# Patient Record
Sex: MALE | Race: WHITE | NOT HISPANIC OR LATINO | URBAN - METROPOLITAN AREA
[De-identification: names, ages, dates, MRNs, and addresses within clinical notes are randomized per-mention and may not be internally consistent; named-entity substitution may affect disease eponyms.]

---

## 2024-10-04 ENCOUNTER — APPOINTMENT (OUTPATIENT)
Dept: BEHAVIORAL HEALTH | Facility: CLINIC | Age: 24
End: 2024-10-04
Payer: COMMERCIAL

## 2024-10-04 VITALS
SYSTOLIC BLOOD PRESSURE: 124 MMHG | HEIGHT: 72 IN | BODY MASS INDEX: 29.19 KG/M2 | HEART RATE: 74 BPM | TEMPERATURE: 97.5 F | DIASTOLIC BLOOD PRESSURE: 78 MMHG | WEIGHT: 215.5 LBS

## 2024-10-04 DIAGNOSIS — F32.89 OTHER SPECIFIED DEPRESSIVE EPISODES: ICD-10-CM

## 2024-10-04 DIAGNOSIS — F90.0 ADHD (ATTENTION DEFICIT HYPERACTIVITY DISORDER), INATTENTIVE TYPE: Primary | ICD-10-CM

## 2024-10-04 PROCEDURE — 99214 OFFICE O/P EST MOD 30 MIN: CPT | Performed by: PSYCHIATRY & NEUROLOGY

## 2024-10-04 PROCEDURE — 3008F BODY MASS INDEX DOCD: CPT | Performed by: PSYCHIATRY & NEUROLOGY

## 2024-10-04 RX ORDER — SERTRALINE HYDROCHLORIDE 100 MG/1
TABLET, FILM COATED ORAL
Qty: 135 TABLET | Refills: 1 | Status: SHIPPED | OUTPATIENT
Start: 2024-10-04

## 2024-10-04 RX ORDER — DEXTROAMPHETAMINE SACCHARATE, AMPHETAMINE ASPARTATE MONOHYDRATE, DEXTROAMPHETAMINE SULFATE AND AMPHETAMINE SULFATE 5; 5; 5; 5 MG/1; MG/1; MG/1; MG/1
20 CAPSULE, EXTENDED RELEASE ORAL EVERY MORNING
Qty: 30 CAPSULE | Refills: 0 | Status: SHIPPED | OUTPATIENT
Start: 2024-10-04 | End: 2024-11-03

## 2024-10-05 NOTE — PROGRESS NOTES
In-person appointment with patient and mother, seen together and patient seen individually.     Last seen 1/2024.      Sertraline 150 mg continues to help with mood.  Denies adverse effects.      Patient reports, and mother agrees, that ADHD symptoms are impairing functioning.    Having difficulty with sustained attention with his [employer] classes.    He has been more forgetful and prone to losing things such as phone, wallet, keys.    Because of above symptoms he has become more frustrated and anxious    Sleep and appetite normal.      Maintaining good grades but with considerable effort.      Good relationship with girlfriend    He denies suicidal or homicidal ideation.     Uses medical cannabis for anxiety    Vitals (reviewed during the appointment):  /78, HR 74, temp 97.5, height 6ft, weight 215 lbs    MSE:  Normal dress and grooming.  Speech normal tone, rate, quality.  Thought process goal-directed.  Euthymic mood, normal range of affect.  Denies suicidal or homicidal ideation.  Alert and oriented X 4.  Judgment and insight fair/good    Dx:  ADHD;  Other Specified Depressive Episodes    Plan:    Begin Adderall XR 20 mg, one every morning.  Consent obtained after review of risks and benefits.   Rx for 20 mg #30 sent to [facility]  Controlled Substance Agreement Form completed  OAR[place] reviewed    Continue Sertraline 150 mg every morning. Ongoing consent obtained.    Rx for 100 mg #135 with one refill sent to [facility]    Follow-up 2-3 months, update 2 weeks

## 2024-10-22 ENCOUNTER — DOCUMENTATION (OUTPATIENT)
Dept: BEHAVIORAL HEALTH | Facility: HOSPITAL | Age: 24
End: 2024-10-22
Payer: COMMERCIAL

## 2024-10-22 DIAGNOSIS — F90.0 ADHD (ATTENTION DEFICIT HYPERACTIVITY DISORDER), INATTENTIVE TYPE: ICD-10-CM

## 2024-10-22 RX ORDER — DEXTROAMPHETAMINE SACCHARATE, AMPHETAMINE ASPARTATE MONOHYDRATE, DEXTROAMPHETAMINE SULFATE AND AMPHETAMINE SULFATE 5; 5; 5; 5 MG/1; MG/1; MG/1; MG/1
20 CAPSULE, EXTENDED RELEASE ORAL EVERY MORNING
Qty: 30 CAPSULE | Refills: 0 | Status: SHIPPED | OUTPATIENT
Start: 2024-10-22 | End: 2024-11-21

## 2024-10-22 NOTE — PROGRESS NOTES
Message from patient below 10/21.  Rx for next Adderall XR 20 mg #30 sent to [facility] 10/22.     Hello, I am emailing to let you know that my meds have been working very well.    Thank you,    García Cha

## 2025-01-10 ENCOUNTER — APPOINTMENT (OUTPATIENT)
Dept: BEHAVIORAL HEALTH | Facility: CLINIC | Age: 25
End: 2025-01-10
Payer: COMMERCIAL

## 2025-01-10 VITALS
TEMPERATURE: 97.6 F | DIASTOLIC BLOOD PRESSURE: 77 MMHG | BODY MASS INDEX: 25.86 KG/M2 | HEIGHT: 72 IN | SYSTOLIC BLOOD PRESSURE: 143 MMHG | WEIGHT: 190.9 LBS

## 2025-01-10 DIAGNOSIS — F32.89 OTHER SPECIFIED DEPRESSIVE EPISODES: ICD-10-CM

## 2025-01-10 DIAGNOSIS — F90.0 ADHD (ATTENTION DEFICIT HYPERACTIVITY DISORDER), INATTENTIVE TYPE: Primary | ICD-10-CM

## 2025-01-10 PROCEDURE — 3008F BODY MASS INDEX DOCD: CPT | Performed by: PSYCHIATRY & NEUROLOGY

## 2025-01-10 PROCEDURE — 99213 OFFICE O/P EST LOW 20 MIN: CPT | Performed by: PSYCHIATRY & NEUROLOGY

## 2025-01-10 RX ORDER — DEXTROAMPHETAMINE SACCHARATE, AMPHETAMINE ASPARTATE, DEXTROAMPHETAMINE SULFATE AND AMPHETAMINE SULFATE 2.5; 2.5; 2.5; 2.5 MG/1; MG/1; MG/1; MG/1
10 TABLET ORAL DAILY
Qty: 30 TABLET | Refills: 0 | Status: SHIPPED | OUTPATIENT
Start: 2025-01-10 | End: 2025-02-09

## 2025-01-12 NOTE — PROGRESS NOTES
In-person appointment with patient     He has been adherent to medication regimen of Adderall XR 20 mg in the morning and Sertraline 150 mg in the morning.      He states Adderall XR 20 mg works well but for too long with his current academic schedule.  He would prefer stimulant to work ~6 hours as opposed to 8 hours or longer.  Preferred hours are 10:00 am-4:00 pm.     He has not required stimulant on non-academic days and has not taken for the last 2 weeks.      He states mood has been good.    He denies any sustained sadness or heightened anxiety.    No doyle or hallucinations.   Denies substance use    Sleep and appetite good    Denies physical complaints    Vitals (reviewed with patient):  /77, temp 97.6, height 6ft, weight 190 lbs    MSE:  Normal dress and grooming.  Thought process goal-directed.  Speech normal tone, rate, quality.  Euthymic mood, full range of affect.  Denies suicidal or homicidal ideation.   Alert and oriented X 4.  No agitation.  Judgment and insight good    Dx:  ADHD;  Other Specified Depressive Episodes    Plan:    Discontinue Adderall XR 20 mg    Begin Adderall 10 mg on academic days.  Consent obtained after review of risks and benefits.  Rx for 10 mg #30 sent to [facility]    Monitor blood pressure    Continue Sertraline 150 mg every morning.  Ongoing consent obtained.  Has sufficient supply.      Follow-up in 3 months, update 2 weeks

## 2025-03-18 DIAGNOSIS — F90.0 ADHD (ATTENTION DEFICIT HYPERACTIVITY DISORDER), INATTENTIVE TYPE: ICD-10-CM

## 2025-03-18 RX ORDER — DEXTROAMPHETAMINE SACCHARATE, AMPHETAMINE ASPARTATE, DEXTROAMPHETAMINE SULFATE AND AMPHETAMINE SULFATE 2.5; 2.5; 2.5; 2.5 MG/1; MG/1; MG/1; MG/1
10 TABLET ORAL DAILY
Qty: 30 TABLET | Refills: 0 | Status: SHIPPED | OUTPATIENT
Start: 2025-03-18 | End: 2025-04-17

## 2025-05-01 DIAGNOSIS — F90.0 ADHD (ATTENTION DEFICIT HYPERACTIVITY DISORDER), INATTENTIVE TYPE: ICD-10-CM

## 2025-05-01 RX ORDER — DEXTROAMPHETAMINE SACCHARATE, AMPHETAMINE ASPARTATE, DEXTROAMPHETAMINE SULFATE AND AMPHETAMINE SULFATE 2.5; 2.5; 2.5; 2.5 MG/1; MG/1; MG/1; MG/1
10 TABLET ORAL DAILY
Qty: 30 TABLET | Refills: 0 | Status: SHIPPED | OUTPATIENT
Start: 2025-05-01 | End: 2025-05-31

## 2025-07-11 DIAGNOSIS — F32.89 OTHER SPECIFIED DEPRESSIVE EPISODES: ICD-10-CM

## 2025-07-11 RX ORDER — SERTRALINE HYDROCHLORIDE 100 MG/1
TABLET, FILM COATED ORAL
Qty: 45 TABLET | Refills: 0 | Status: SHIPPED | OUTPATIENT
Start: 2025-07-11 | End: 2025-07-17 | Stop reason: SDUPTHER

## 2025-07-14 ENCOUNTER — DOCUMENTATION (OUTPATIENT)
Dept: BEHAVIORAL HEALTH | Facility: HOSPITAL | Age: 25
End: 2025-07-14
Payer: COMMERCIAL

## 2025-07-14 NOTE — PROGRESS NOTES
7/10-7/11/25 communication with patient below.     Yes, call next week to set up an appointment, [phone].  I sent a one month Rx to your [facility]    From: García Cha <[email or web address]>   Sent: Friday, July 11, 2025 6:01 PM  To: Keith Wells [email or web address]>  Subject: Re: prescription refill    I only have a few days worth. Could I make an appointment for some point in august. Thank you, García Cha On Jul 10, 2025, at 1:?33 PM, Keith Wells [email or web address] wrote: ? How much of a supply do you have remaining  ZjQcmQRYFpfptBannerStart  Notice - This message is from a new sender    You have not previously corresponded with this sender. If the sender appears to be someone you know, verify they sent this message via phone, text, or in person communication.        Report Suspicious             ZjQcmQRYFpfptBannerEnd  I only have a few days worth. Could I make an appointment for some point in august. Thank you,  García Cha      On Jul 10, 2025, at 1:33?PM, Keith Wells [email or web address]> wrote:  ?   How much of a supply do you have remaining García?  With last appointment in January we need to schedule a follow-up appointment     From: García LIZARRAGA <[email or web address]>   Sent: Thursday, July 10, 2025 12:59 PM  To: Keith Wells [email or web address]>  Subject: prescription refill     hello,  I would like to get a refill on my antidepressant medication. Thank you, García alexander,      I would like to get a refill on my antidepressant medication.     Thank you,     García Cha

## 2025-07-17 ENCOUNTER — OFFICE VISIT (OUTPATIENT)
Dept: BEHAVIORAL HEALTH | Facility: CLINIC | Age: 25
End: 2025-07-17
Payer: COMMERCIAL

## 2025-07-17 VITALS
BODY MASS INDEX: 22.54 KG/M2 | DIASTOLIC BLOOD PRESSURE: 74 MMHG | HEART RATE: 69 BPM | TEMPERATURE: 98.6 F | SYSTOLIC BLOOD PRESSURE: 117 MMHG | HEIGHT: 72 IN | WEIGHT: 166.38 LBS

## 2025-07-17 DIAGNOSIS — F90.0 ADHD (ATTENTION DEFICIT HYPERACTIVITY DISORDER), INATTENTIVE TYPE: Primary | ICD-10-CM

## 2025-07-17 DIAGNOSIS — F32.89 OTHER SPECIFIED DEPRESSIVE EPISODES: ICD-10-CM

## 2025-07-17 PROCEDURE — 3008F BODY MASS INDEX DOCD: CPT | Performed by: PSYCHIATRY & NEUROLOGY

## 2025-07-17 PROCEDURE — 99214 OFFICE O/P EST MOD 30 MIN: CPT | Performed by: PSYCHIATRY & NEUROLOGY

## 2025-07-17 RX ORDER — DEXTROAMPHETAMINE SACCHARATE, AMPHETAMINE ASPARTATE, DEXTROAMPHETAMINE SULFATE AND AMPHETAMINE SULFATE 2.5; 2.5; 2.5; 2.5 MG/1; MG/1; MG/1; MG/1
10 TABLET ORAL DAILY
Qty: 30 TABLET | Refills: 0 | Status: SHIPPED | OUTPATIENT
Start: 2025-09-15 | End: 2025-10-15

## 2025-07-17 RX ORDER — SERTRALINE HYDROCHLORIDE 100 MG/1
TABLET, FILM COATED ORAL
Qty: 135 TABLET | Refills: 1 | Status: SHIPPED | OUTPATIENT
Start: 2025-07-17

## 2025-07-17 RX ORDER — DEXTROAMPHETAMINE SACCHARATE, AMPHETAMINE ASPARTATE, DEXTROAMPHETAMINE SULFATE AND AMPHETAMINE SULFATE 2.5; 2.5; 2.5; 2.5 MG/1; MG/1; MG/1; MG/1
10 TABLET ORAL DAILY
Qty: 30 TABLET | Refills: 0 | Status: SHIPPED | OUTPATIENT
Start: 2025-07-17 | End: 2025-08-16

## 2025-07-17 RX ORDER — DEXTROAMPHETAMINE SACCHARATE, AMPHETAMINE ASPARTATE, DEXTROAMPHETAMINE SULFATE AND AMPHETAMINE SULFATE 2.5; 2.5; 2.5; 2.5 MG/1; MG/1; MG/1; MG/1
10 TABLET ORAL DAILY
Qty: 30 TABLET | Refills: 0 | Status: SHIPPED | OUTPATIENT
Start: 2025-08-16 | End: 2025-09-15

## 2025-07-18 NOTE — PROGRESS NOTES
"In-person appointment with patient and mother, seen together and patient seen individually.      Patient states during the months of May and June he was feeling more depressed.  He states in the last week or two he has been feeling better.      He has been adherent to Sertraline 150 mg daily.    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      He denies suicidal or homicidal ideation.   No doyle or hallucinations  Uses cannabis at night, aware of risks.      Has two more semesters remaining with [employer].  Earning mostly good grades.      Had moment of heightened anxiety 6/21 when parents went out and patient and girlfriend were babysitting 2 cousins under age 10 who are now living with them.      Typically sleeps through the night.  Appetite good    Vitals:  /74, HR 69, temp 98.6, weight 166 lbs, height 6ft    MSE:  Normal dress and grooming.  Thought process goal-directed.  Speech normal tone, rate, quality.  Euthymic mood, full range of affect.  Denies suicidal or homicidal ideation.  No agitation.  No tics.  Alert and oriented X 4.  Judgment and insight fair.      Dx:  ADHD;  Other Specified Depressive Episodes    Plan:    Continue Sertraline 150 mg every morning.  Ongoing consent obtained.  Rx for 100 mg #135 with one refill sent to [facility].      Continue Adderall 10 mg every morning.  Ongoing consent obtained.  Rx for 10 mg #30 X 3 sent to [facility]  OARRS reviewed.  I have considered the risk of abuse, dependence, addiction and diversion  [facility] drug screen ordered    Reviewed [facility] site toe establish with therapist    Follow-up in 3 months, call as needed in the interim  "